# Patient Record
Sex: MALE | Race: WHITE | Employment: FULL TIME | URBAN - NONMETROPOLITAN AREA
[De-identification: names, ages, dates, MRNs, and addresses within clinical notes are randomized per-mention and may not be internally consistent; named-entity substitution may affect disease eponyms.]

---

## 2017-03-15 RX ORDER — LISINOPRIL 2.5 MG/1
2.5 TABLET ORAL DAILY
Qty: 90 TABLET | Refills: 4 | Status: SHIPPED | OUTPATIENT
Start: 2017-03-15 | End: 2017-09-07 | Stop reason: SDUPTHER

## 2017-03-15 RX ORDER — SIMVASTATIN 40 MG
40 TABLET ORAL NIGHTLY
Qty: 90 TABLET | Refills: 4 | Status: SHIPPED | OUTPATIENT
Start: 2017-03-15 | End: 2017-09-07 | Stop reason: SDUPTHER

## 2017-03-15 RX ORDER — CLOPIDOGREL BISULFATE 75 MG/1
75 TABLET ORAL DAILY
Qty: 90 TABLET | Refills: 4 | Status: SHIPPED | OUTPATIENT
Start: 2017-03-15 | End: 2017-09-07 | Stop reason: SDUPTHER

## 2017-09-07 ENCOUNTER — OFFICE VISIT (OUTPATIENT)
Dept: CARDIOLOGY CLINIC | Age: 57
End: 2017-09-07
Payer: COMMERCIAL

## 2017-09-07 VITALS
DIASTOLIC BLOOD PRESSURE: 70 MMHG | SYSTOLIC BLOOD PRESSURE: 120 MMHG | BODY MASS INDEX: 31.51 KG/M2 | HEART RATE: 60 BPM | WEIGHT: 232.6 LBS | HEIGHT: 72 IN

## 2017-09-07 DIAGNOSIS — I25.10 CORONARY ARTERY DISEASE INVOLVING NATIVE CORONARY ARTERY OF NATIVE HEART WITHOUT ANGINA PECTORIS: Primary | ICD-10-CM

## 2017-09-07 DIAGNOSIS — I25.2 HISTORY OF ACUTE MYOCARDIAL INFARCTION OF ANTERIOR WALL: ICD-10-CM

## 2017-09-07 DIAGNOSIS — F17.200 CURRENT SMOKER: ICD-10-CM

## 2017-09-07 DIAGNOSIS — Z95.5 PRESENCE OF STENT IN LAD CORONARY ARTERY: ICD-10-CM

## 2017-09-07 DIAGNOSIS — E78.5 DYSLIPIDEMIA: ICD-10-CM

## 2017-09-07 DIAGNOSIS — I51.9 LV DYSFUNCTION: ICD-10-CM

## 2017-09-07 DIAGNOSIS — I10 ESSENTIAL HYPERTENSION: ICD-10-CM

## 2017-09-07 PROCEDURE — 3017F COLORECTAL CA SCREEN DOC REV: CPT | Performed by: INTERNAL MEDICINE

## 2017-09-07 PROCEDURE — 1036F TOBACCO NON-USER: CPT | Performed by: INTERNAL MEDICINE

## 2017-09-07 PROCEDURE — G8427 DOCREV CUR MEDS BY ELIG CLIN: HCPCS | Performed by: INTERNAL MEDICINE

## 2017-09-07 PROCEDURE — G8598 ASA/ANTIPLAT THER USED: HCPCS | Performed by: INTERNAL MEDICINE

## 2017-09-07 PROCEDURE — G8417 CALC BMI ABV UP PARAM F/U: HCPCS | Performed by: INTERNAL MEDICINE

## 2017-09-07 PROCEDURE — 93000 ELECTROCARDIOGRAM COMPLETE: CPT | Performed by: INTERNAL MEDICINE

## 2017-09-07 PROCEDURE — 99213 OFFICE O/P EST LOW 20 MIN: CPT | Performed by: INTERNAL MEDICINE

## 2017-09-07 RX ORDER — SIMVASTATIN 40 MG
40 TABLET ORAL NIGHTLY
Qty: 90 TABLET | Refills: 4 | Status: SHIPPED | OUTPATIENT
Start: 2017-09-07 | End: 2018-09-15 | Stop reason: SDUPTHER

## 2017-09-07 RX ORDER — LISINOPRIL 2.5 MG/1
2.5 TABLET ORAL DAILY
Qty: 90 TABLET | Refills: 4 | Status: SHIPPED | OUTPATIENT
Start: 2017-09-07 | End: 2018-09-10 | Stop reason: SDUPTHER

## 2017-09-07 RX ORDER — CLOPIDOGREL BISULFATE 75 MG/1
75 TABLET ORAL DAILY
Qty: 90 TABLET | Refills: 4 | Status: SHIPPED | OUTPATIENT
Start: 2017-09-07 | End: 2018-09-15 | Stop reason: SDUPTHER

## 2018-09-10 ENCOUNTER — OFFICE VISIT (OUTPATIENT)
Dept: CARDIOLOGY CLINIC | Age: 58
End: 2018-09-10
Payer: COMMERCIAL

## 2018-09-10 VITALS
SYSTOLIC BLOOD PRESSURE: 136 MMHG | HEIGHT: 72 IN | BODY MASS INDEX: 32.44 KG/M2 | DIASTOLIC BLOOD PRESSURE: 84 MMHG | HEART RATE: 60 BPM | WEIGHT: 239.5 LBS

## 2018-09-10 DIAGNOSIS — I25.10 CORONARY ARTERY DISEASE INVOLVING NATIVE CORONARY ARTERY OF NATIVE HEART WITHOUT ANGINA PECTORIS: Primary | ICD-10-CM

## 2018-09-10 DIAGNOSIS — Z95.5 PRESENCE OF STENT IN LAD CORONARY ARTERY: ICD-10-CM

## 2018-09-10 DIAGNOSIS — I10 ESSENTIAL HYPERTENSION: ICD-10-CM

## 2018-09-10 DIAGNOSIS — E78.5 DYSLIPIDEMIA: ICD-10-CM

## 2018-09-10 PROCEDURE — 99214 OFFICE O/P EST MOD 30 MIN: CPT | Performed by: INTERNAL MEDICINE

## 2018-09-10 PROCEDURE — G8598 ASA/ANTIPLAT THER USED: HCPCS | Performed by: INTERNAL MEDICINE

## 2018-09-10 PROCEDURE — 1036F TOBACCO NON-USER: CPT | Performed by: INTERNAL MEDICINE

## 2018-09-10 PROCEDURE — G8427 DOCREV CUR MEDS BY ELIG CLIN: HCPCS | Performed by: INTERNAL MEDICINE

## 2018-09-10 PROCEDURE — G8417 CALC BMI ABV UP PARAM F/U: HCPCS | Performed by: INTERNAL MEDICINE

## 2018-09-10 PROCEDURE — 93000 ELECTROCARDIOGRAM COMPLETE: CPT | Performed by: INTERNAL MEDICINE

## 2018-09-10 PROCEDURE — 3017F COLORECTAL CA SCREEN DOC REV: CPT | Performed by: INTERNAL MEDICINE

## 2018-09-10 RX ORDER — LISINOPRIL 2.5 MG/1
2.5 TABLET ORAL DAILY
Qty: 90 TABLET | Refills: 3 | Status: SHIPPED | OUTPATIENT
Start: 2018-09-10 | End: 2020-09-15

## 2018-09-10 NOTE — PROGRESS NOTES
Chief Complaint   Patient presents with   Wallie Jeans    Coronary Artery Disease     Former Suzanne Villegas pt here for 1 yr check up     Pt denies chest pain, heart palpitations, peripheral edema, sob,     EKG done today    Records reviewed      Patient Active Problem List   Diagnosis    ST elevation myocardial infarction (STEMI) of anterior wall (HCC)    Presence of stent in LAD coronary artery    HTN (hypertension)    Dyslipidemia    History of acute myocardial infarction of anterior wall    Antiplatelet or antithrombotic long-term use    CAD (coronary artery disease)    Ex-smoker       Past Surgical History:   Procedure Laterality Date    APPENDECTOMY      CORONARY ANGIOPLASTY WITH STENT PLACEMENT  11/09/13       No Known Allergies     Family History   Problem Relation Age of Onset    Heart Disease Mother     High Blood Pressure Mother     High Cholesterol Mother     Cancer Father     Mental Retardation Sister         half sister - downs syndrome    Other Sister 61        sister - name unknown to pt.-blood disorder    Heart Disease Brother         Social History     Social History    Marital status:      Spouse name: N/A    Number of children: N/A    Years of education: N/A     Occupational History    Not on file.      Social History Main Topics    Smoking status: Former Smoker     Packs/day: 0.25     Years: 20.00    Smokeless tobacco: Never Used    Alcohol use 7.2 oz/week     12 Cans of beer per week    Drug use: No    Sexual activity: Yes     Partners: Female     Other Topics Concern    Not on file     Social History Narrative    No narrative on file       Current Outpatient Prescriptions   Medication Sig Dispense Refill    lisinopril (PRINIVIL;ZESTRIL) 2.5 MG tablet Take 1 tablet by mouth daily 90 tablet 4    clopidogrel (PLAVIX) 75 MG tablet Take 1 tablet by mouth daily 90 tablet 4    metoprolol tartrate (LOPRESSOR) 25 MG tablet Take 1 tablet by mouth 2 times daily 180 tablet 4  simvastatin (ZOCOR) 40 MG tablet Take 1 tablet by mouth nightly 90 tablet 4    [facility] ASPIRIN ADULT LOW DOSE 81 MG chewable tablet CHEW 1 TABLET DAILY 90 tablet 2     No current facility-administered medications for this visit. Review of Systems -     General ROS: negative  Psychological ROS: negative  Hematological and Lymphatic ROS: No history of blood clots or bleeding disorder. Respiratory ROS: no cough, shortness of breath, or wheezing  Cardiovascular ROS: no chest pain or dyspnea on exertion  Gastrointestinal ROS: negative  Genito-Urinary ROS: no dysuria, trouble voiding, or hematuria  Musculoskeletal ROS: negative  Neurological ROS: no TIA or stroke symptoms  Dermatological ROS: negative      Blood pressure 136/84, pulse 60, height 6' (1.829 m), weight 239 lb 8 oz (108.6 kg). Physical Examination:    General appearance - alert, well appearing, and in no distress  Mental status - alert, oriented to person, place, and time  Neck - supple, no significant adenopathy, no JVD, or carotid bruits  Chest - clear to auscultation, no wheezes, rales or rhonchi, symmetric air entry  Heart - normal rate, regular rhythm, normal S1, S2, no murmurs, rubs, clicks or gallops  Abdomen - soft, nontender, nondistended, no masses or organomegaly  Neurological - alert, oriented, normal speech, no focal findings or movement disorder noted  Musculoskeletal - no joint tenderness, deformity or swelling  Extremities - peripheral pulses normal, no pedal edema, no clubbing or cyanosis  Skin - normal coloration and turgor, no rashes, no suspicious skin lesions noted    Lab  No results for input(s): CKTOTAL, CKMB, CKMBINDEX, TROPONINI in the last 72 hours.   CBC:   Lab Results   Component Value Date    WBC 10.9 11/11/2013    RBC 4.90 11/11/2013    HGB 14.6 11/11/2013    HCT 43.5 11/11/2013    MCV 88.7 11/11/2013    MCH 29.7 11/11/2013    MCHC 33.5 11/11/2013    RDW 14.0 11/11/2013     11/11/2013    MPV 8.2 11/11/2013 BMP:    Lab Results   Component Value Date     11/10/2013    K 3.8 11/10/2013     11/10/2013    CO2 25 11/10/2013    BUN 14 11/10/2013    CREATININE 0.8 11/10/2013    CALCIUM 9.2 11/10/2013    LABGLOM >90 11/10/2013    GLUCOSE 130 11/10/2013     Hepatic Function Panel:  No results found for: ALKPHOS, ALT, AST, PROT, BILITOT, BILIDIR, IBILI, LABALBU  Magnesium:  No results found for: MG  Warfarin PT/INR:  No components found for: PTPATWAR, PTINRWAR  HgBA1c:  No results found for: LABA1C  FLP:    Lab Results   Component Value Date    TRIG 271 01/09/2016    HDL 41 01/09/2016    LDLCALC 73 01/09/2016     TSH:  No results found for: TSH    Sinus  Rhythm   WITHIN NORMAL LIMITS     cath 2013  FINDINGS:  The left main artery is large, short, and patent. It bifurcates  to left circumflex artery and left anterior descending artery. The left  circumflex artery is a dominant vessel and is patent. OM1 branch is a medium  to large caliber vessel and patent. The left anterior descending artery is a  large vessel. Proximally, it is patent. The mid LAD is subtotally occluded. There is a  diagonal branch coming off at the mid LAD segment. It is a  diagonal 2 branch and has ostial also 70-80 percent stenosis. The distal LAD  is patent. The right coronary artery is a nondominant vessel, is a medium  caliber vessel, and patent. The LVEDP is 8-10 mmHg. Anterior apical and  apical hypokinesia. LVEF is 45 percent. No mitral regurgitation or aortic  stenosis identified. subtolat occluded lad was stented for stemi      Assessment   Diagnosis Orders   1. Coronary artery disease involving native coronary artery of native heart without angina pectoris  EKG 12 Lead   2. Presence of stent in LAD coronary artery     3. Essential hypertension     4. Dyslipidemia           Dr. Gissel France office Visit Dx    History of Anterior Wall STEMI on 1/9/14. S/P PCI to LAD with 3.25-23 mm Drug-eluting stent.   Coronary artery

## 2018-09-15 DIAGNOSIS — E78.5 DYSLIPIDEMIA: ICD-10-CM

## 2018-09-15 DIAGNOSIS — I10 ESSENTIAL HYPERTENSION: Primary | ICD-10-CM

## 2018-09-17 RX ORDER — CLOPIDOGREL BISULFATE 75 MG/1
75 TABLET ORAL DAILY
Qty: 90 TABLET | Refills: 3 | Status: SHIPPED | OUTPATIENT
Start: 2018-09-17 | End: 2019-09-12 | Stop reason: SDUPTHER

## 2018-09-17 RX ORDER — LISINOPRIL 2.5 MG/1
2.5 TABLET ORAL DAILY
Qty: 90 TABLET | Refills: 3 | Status: SHIPPED | OUTPATIENT
Start: 2018-09-17 | End: 2019-09-10 | Stop reason: ALTCHOICE

## 2018-09-17 RX ORDER — SIMVASTATIN 40 MG
40 TABLET ORAL NIGHTLY
Qty: 90 TABLET | Refills: 3 | Status: SHIPPED | OUTPATIENT
Start: 2018-09-17 | End: 2019-10-21 | Stop reason: SDUPTHER

## 2018-09-18 LAB
A/G RATIO: NORMAL
ALBUMIN SERPL-MCNC: 4.6 G/DL
ALP BLD-CCNC: 50 U/L
ALT SERPL-CCNC: 32 U/L
AST SERPL-CCNC: 23 U/L
BILIRUB SERPL-MCNC: 0.4 MG/DL (ref 0.1–1.4)
BILIRUBIN DIRECT: 0.2 MG/DL
BILIRUBIN, INDIRECT: NORMAL
CHOLESTEROL, TOTAL: 151 MG/DL
CHOLESTEROL/HDL RATIO: NORMAL
GLOBULIN: NORMAL
HDLC SERPL-MCNC: 42 MG/DL (ref 35–70)
LDL CHOLESTEROL CALCULATED: 49 MG/DL (ref 0–160)
PROTEIN TOTAL: 7.3 G/DL
TRIGL SERPL-MCNC: 298 MG/DL
VLDLC SERPL CALC-MCNC: 60 MG/DL

## 2018-09-19 ENCOUNTER — TELEPHONE (OUTPATIENT)
Dept: CARDIOLOGY CLINIC | Age: 58
End: 2018-09-19

## 2018-09-19 DIAGNOSIS — I21.09 ST ELEVATION MYOCARDIAL INFARCTION (STEMI) OF ANTERIOR WALL (HCC): Primary | ICD-10-CM

## 2018-09-19 DIAGNOSIS — I25.10 CORONARY ARTERY DISEASE INVOLVING NATIVE CORONARY ARTERY OF NATIVE HEART WITHOUT ANGINA PECTORIS: ICD-10-CM

## 2018-09-19 DIAGNOSIS — E78.5 DYSLIPIDEMIA: ICD-10-CM

## 2019-09-10 ENCOUNTER — OFFICE VISIT (OUTPATIENT)
Dept: CARDIOLOGY CLINIC | Age: 59
End: 2019-09-10
Payer: COMMERCIAL

## 2019-09-10 VITALS
WEIGHT: 238.2 LBS | BODY MASS INDEX: 31.57 KG/M2 | DIASTOLIC BLOOD PRESSURE: 66 MMHG | HEART RATE: 62 BPM | HEIGHT: 73 IN | SYSTOLIC BLOOD PRESSURE: 108 MMHG

## 2019-09-10 DIAGNOSIS — E78.5 DYSLIPIDEMIA: ICD-10-CM

## 2019-09-10 DIAGNOSIS — I25.10 CORONARY ARTERY DISEASE INVOLVING NATIVE CORONARY ARTERY OF NATIVE HEART WITHOUT ANGINA PECTORIS: Primary | ICD-10-CM

## 2019-09-10 DIAGNOSIS — I25.5 ISCHEMIC CARDIOMYOPATHY: ICD-10-CM

## 2019-09-10 DIAGNOSIS — Z95.5 PRESENCE OF STENT IN LAD CORONARY ARTERY: ICD-10-CM

## 2019-09-10 DIAGNOSIS — I10 ESSENTIAL HYPERTENSION: ICD-10-CM

## 2019-09-10 PROCEDURE — G8598 ASA/ANTIPLAT THER USED: HCPCS | Performed by: INTERNAL MEDICINE

## 2019-09-10 PROCEDURE — G8427 DOCREV CUR MEDS BY ELIG CLIN: HCPCS | Performed by: INTERNAL MEDICINE

## 2019-09-10 PROCEDURE — 99214 OFFICE O/P EST MOD 30 MIN: CPT | Performed by: INTERNAL MEDICINE

## 2019-09-10 PROCEDURE — 3017F COLORECTAL CA SCREEN DOC REV: CPT | Performed by: INTERNAL MEDICINE

## 2019-09-10 PROCEDURE — G8417 CALC BMI ABV UP PARAM F/U: HCPCS | Performed by: INTERNAL MEDICINE

## 2019-09-10 PROCEDURE — 93000 ELECTROCARDIOGRAM COMPLETE: CPT | Performed by: INTERNAL MEDICINE

## 2019-09-10 PROCEDURE — 1036F TOBACCO NON-USER: CPT | Performed by: INTERNAL MEDICINE

## 2019-09-12 RX ORDER — CLOPIDOGREL BISULFATE 75 MG/1
TABLET ORAL
Qty: 90 TABLET | Refills: 3 | Status: SHIPPED | OUTPATIENT
Start: 2019-09-12 | End: 2020-09-14

## 2019-09-17 ENCOUNTER — HOSPITAL ENCOUNTER (OUTPATIENT)
Dept: NON INVASIVE DIAGNOSTICS | Age: 59
Discharge: HOME | End: 2019-09-17
Payer: COMMERCIAL

## 2019-09-17 VITALS — WEIGHT: 235 LBS | HEIGHT: 73 IN | BODY MASS INDEX: 31.14 KG/M2

## 2019-09-17 DIAGNOSIS — I10 ESSENTIAL HYPERTENSION: ICD-10-CM

## 2019-09-17 DIAGNOSIS — E78.5 DYSLIPIDEMIA: ICD-10-CM

## 2019-09-17 DIAGNOSIS — I25.5 ISCHEMIC CARDIOMYOPATHY: ICD-10-CM

## 2019-09-17 DIAGNOSIS — I25.10 CORONARY ARTERY DISEASE INVOLVING NATIVE CORONARY ARTERY OF NATIVE HEART WITHOUT ANGINA PECTORIS: ICD-10-CM

## 2019-09-17 DIAGNOSIS — Z95.5 PRESENCE OF STENT IN LAD CORONARY ARTERY: ICD-10-CM

## 2019-09-17 LAB
LV EF: 56 %
LV EF: 60 %
LVEF MODALITY: NORMAL
LVEF MODALITY: NORMAL

## 2019-09-17 PROCEDURE — 93306 TTE W/DOPPLER COMPLETE: CPT

## 2019-09-17 PROCEDURE — A9500 TC99M SESTAMIBI: HCPCS | Performed by: INTERNAL MEDICINE

## 2019-09-17 PROCEDURE — 78452 HT MUSCLE IMAGE SPECT MULT: CPT

## 2019-09-17 PROCEDURE — 93017 CV STRESS TEST TRACING ONLY: CPT | Performed by: INTERNAL MEDICINE

## 2019-09-17 PROCEDURE — 2709999900 HC NON-CHARGEABLE SUPPLY

## 2019-09-17 PROCEDURE — 3430000000 HC RX DIAGNOSTIC RADIOPHARMACEUTICAL: Performed by: INTERNAL MEDICINE

## 2019-09-17 RX ADMIN — Medication 10 MILLICURIE: at 14:36

## 2019-09-17 RX ADMIN — Medication 35 MILLICURIE: at 15:20

## 2019-09-28 LAB
A/G RATIO: NORMAL
ALBUMIN SERPL-MCNC: 4.5 G/DL
ALP BLD-CCNC: 48 U/L
ALT SERPL-CCNC: 27 U/L
AST SERPL-CCNC: 18 U/L
BILIRUB SERPL-MCNC: NORMAL MG/DL (ref 0.1–1.4)
BILIRUBIN DIRECT: NORMAL MG/DL
BILIRUBIN, INDIRECT: NORMAL
GLOBULIN: NORMAL
PROTEIN TOTAL: 7.1 G/DL

## 2019-10-21 RX ORDER — SIMVASTATIN 40 MG
40 TABLET ORAL NIGHTLY
Qty: 90 TABLET | Refills: 3 | Status: SHIPPED | OUTPATIENT
Start: 2019-10-21 | End: 2020-09-16 | Stop reason: SDUPTHER

## 2019-12-16 RX ORDER — LISINOPRIL 2.5 MG/1
TABLET ORAL
Qty: 90 TABLET | Refills: 3 | Status: SHIPPED | OUTPATIENT
Start: 2019-12-16 | End: 2020-09-15 | Stop reason: SDUPTHER

## 2020-09-14 RX ORDER — CLOPIDOGREL BISULFATE 75 MG/1
TABLET ORAL
Qty: 90 TABLET | Refills: 0 | Status: SHIPPED | OUTPATIENT
Start: 2020-09-14 | End: 2020-09-15 | Stop reason: SDUPTHER

## 2020-09-15 ENCOUNTER — OFFICE VISIT (OUTPATIENT)
Dept: CARDIOLOGY CLINIC | Age: 60
End: 2020-09-15
Payer: COMMERCIAL

## 2020-09-15 VITALS
BODY MASS INDEX: 31.42 KG/M2 | HEART RATE: 54 BPM | SYSTOLIC BLOOD PRESSURE: 116 MMHG | DIASTOLIC BLOOD PRESSURE: 66 MMHG | WEIGHT: 232 LBS | HEIGHT: 72 IN

## 2020-09-15 PROCEDURE — 3017F COLORECTAL CA SCREEN DOC REV: CPT | Performed by: INTERNAL MEDICINE

## 2020-09-15 PROCEDURE — 1036F TOBACCO NON-USER: CPT | Performed by: INTERNAL MEDICINE

## 2020-09-15 PROCEDURE — 93000 ELECTROCARDIOGRAM COMPLETE: CPT | Performed by: INTERNAL MEDICINE

## 2020-09-15 PROCEDURE — 99214 OFFICE O/P EST MOD 30 MIN: CPT | Performed by: INTERNAL MEDICINE

## 2020-09-15 PROCEDURE — G8417 CALC BMI ABV UP PARAM F/U: HCPCS | Performed by: INTERNAL MEDICINE

## 2020-09-15 PROCEDURE — G8427 DOCREV CUR MEDS BY ELIG CLIN: HCPCS | Performed by: INTERNAL MEDICINE

## 2020-09-15 NOTE — PROGRESS NOTES
Chief Complaint   Patient presents with    1 Year Follow Up     stress and echo     Former [id number] pt      1 year F/u     Doing well     Pt denies chest pain, heart palpitations, peripheral edema, sob,     EKG done today    Records reviewed      Patient Active Problem List   Diagnosis    ST elevation myocardial infarction (STEMI) of anterior wall (HCC)    Presence of stent in LAD coronary artery    HTN (hypertension)    Dyslipidemia    History of acute myocardial infarction of anterior wall    Antiplatelet or antithrombotic long-term use    CAD (coronary artery disease)    Ex-smoker    Ischemic cardiomyopathy 40 to 45%       Past Surgical History:   Procedure Laterality Date    APPENDECTOMY      CORONARY ANGIOPLASTY WITH STENT PLACEMENT  11/09/13       No Known Allergies     Family History   Problem Relation Age of Onset    Heart Disease Mother     High Blood Pressure Mother     High Cholesterol Mother     Cancer Father     Mental Retardation Sister         half sister - downs syndrome    Other Sister 61        sister - name unknown to pt.-blood disorder    Heart Disease Brother         Social History     Socioeconomic History    Marital status:      Spouse name: Not on file    Number of children: Not on file    Years of education: Not on file    Highest education level: Not on file   Occupational History    Not on file   Social Needs    Financial resource strain: Not on file    Food insecurity     Worry: Not on file     Inability: Not on file    Transportation needs     Medical: Not on file     Non-medical: Not on file   Tobacco Use    Smoking status: Former Smoker     Packs/day: 0.25     Years: 20.00     Pack years: 5.00    Smokeless tobacco: Never Used   Substance and Sexual Activity    Alcohol use:  Yes     Alcohol/week: 12.0 standard drinks     Types: 12 Cans of beer per week    Drug use: No    Sexual activity: Yes     Partners: Female   Lifestyle    Physical activity Days per week: Not on file     Minutes per session: Not on file    Stress: Not on file   Relationships    Social connections     Talks on phone: Not on file     Gets together: Not on file     Attends Christianity service: Not on file     Active member of club or organization: Not on file     Attends meetings of clubs or organizations: Not on file     Relationship status: Not on file    Intimate partner violence     Fear of current or ex partner: Not on file     Emotionally abused: Not on file     Physically abused: Not on file     Forced sexual activity: Not on file   Other Topics Concern    Not on file   Social History Narrative    Not on file       Current Outpatient Medications   Medication Sig Dispense Refill    clopidogrel (PLAVIX) 75 MG tablet TAKE 1 TABLET BY MOUTH EVERY DAY 90 tablet 0    metoprolol tartrate (LOPRESSOR) 25 MG tablet TAKE 1 TABLET BY MOUTH TWICE A  tablet 0    lisinopril (PRINIVIL;ZESTRIL) 2.5 MG tablet TAKE 1 TABLET BY MOUTH EVERY DAY 90 tablet 3    simvastatin (ZOCOR) 40 MG tablet Take 1 tablet by mouth nightly 90 tablet 3    [facility] ASPIRIN ADULT LOW DOSE 81 MG chewable tablet CHEW 1 TABLET DAILY 90 tablet 2     No current facility-administered medications for this visit. Review of Systems -     General ROS: negative  Psychological ROS: negative  Hematological and Lymphatic ROS: No history of blood clots or bleeding disorder. Respiratory ROS: no cough, shortness of breath, or wheezing  Cardiovascular ROS: no chest pain or dyspnea on exertion  Gastrointestinal ROS: negative  Genito-Urinary ROS: no dysuria, trouble voiding, or hematuria  Musculoskeletal ROS: negative  Neurological ROS: no TIA or stroke symptoms  Dermatological ROS: negative      Blood pressure 116/66, pulse 54, height 6' (1.829 m), weight 232 lb (105.2 kg).         Physical Examination:    General appearance - alert, well appearing, and in no distress  Mental status - alert, oriented to person, place, and time  Neck - supple, no significant adenopathy, no JVD, or carotid bruits  Chest - clear to auscultation, no wheezes, rales or rhonchi, symmetric air entry  Heart - normal rate, regular rhythm, normal S1, S2, no murmurs, rubs, clicks or gallops  Abdomen - soft, nontender, nondistended, no masses or organomegaly  Neurological - alert, oriented, normal speech, no focal findings or movement disorder noted  Musculoskeletal - no joint tenderness, deformity or swelling  Extremities - peripheral pulses normal, no pedal edema, no clubbing or cyanosis  Skin - normal coloration and turgor, no rashes, no suspicious skin lesions noted    Lab  No results for input(s): CKTOTAL, CKMB, CKMBINDEX, TROPONINI in the last 72 hours. CBC:   Lab Results   Component Value Date    WBC 10.9 11/11/2013    RBC 4.90 11/11/2013    HGB 14.6 11/11/2013    HCT 43.5 11/11/2013    MCV 88.7 11/11/2013    MCH 29.7 11/11/2013    MCHC 33.5 11/11/2013    RDW 14.0 11/11/2013     11/11/2013    MPV 8.2 11/11/2013     BMP:    Lab Results   Component Value Date     11/10/2013    K 3.8 11/10/2013     11/10/2013    CO2 25 11/10/2013    BUN 14 11/10/2013    LABALBU 4.5 09/28/2019    CREATININE 0.8 11/10/2013    CALCIUM 9.2 11/10/2013    LABGLOM >90 11/10/2013    GLUCOSE 130 11/10/2013     Hepatic Function Panel:    Lab Results   Component Value Date    ALKPHOS 48 09/28/2019    ALT 27 09/28/2019    AST 18 09/28/2019    PROT 7.1 09/28/2019    BILITOT 0.4 09/18/2018    BILIDIR 0.2 09/18/2018    LABALBU 4.5 09/28/2019     Magnesium:  No results found for: MG  Warfarin PT/INR:  No components found for: PTPATWAR, PTINRWAR  HgBA1c:  No results found for: LABA1C  FLP:    Lab Results   Component Value Date    TRIG 298 09/18/2018    HDL 42 09/18/2018    LDLCALC 49 09/18/2018     TSH:  No results found for: TSH    Sinus  Rhythm   WITHIN NORMAL LIMITS     cath 2013  FINDINGS:  The left main artery is large, short, and patent.   It bifurcates  to left circumflex artery and left anterior descending artery. The left  circumflex artery is a dominant vessel and is patent. OM1 branch is a medium  to large caliber vessel and patent. The left anterior descending artery is a  large vessel. Proximally, it is patent. The mid LAD is subtotally occluded. There is a  diagonal branch coming off at the mid LAD segment. It is a  diagonal 2 branch and has ostial also 70-80 percent stenosis. The distal LAD  is patent. The right coronary artery is a nondominant vessel, is a medium  caliber vessel, and patent. The LVEDP is 8-10 mmHg. Anterior apical and  apical hypokinesia. LVEF is 45 percent. No mitral regurgitation or aortic  stenosis identified. subtolat occluded lad was stented for stemi    SUMMARY:    Left ventricle:  Size was normal.  Systolic function was mildly to moderately reduced. Ejection fraction was  estimated in the range of 40 % to 45 %. There was mild diffuse hypokinesis. The mid anteroseptal wall was hypokinetic. The mid anterior wall was akinetic. The apical anterior wall was dyskinetic. Tricuspid valve: There was mild regurgitation. Prepared and signed by    Genna Lovell MD  Signed [id number] 08:20:10    ekg 9/10/19  Sinus  Rhythm   WITHIN NORMAL LIMITS       Conclusions          Summary     Exercise EKG stress test is not suggestive for ischemia.     The nuclear images is not suggestive for myocardial ischemia.          Signatures          ----------------------------------------------------------------     Electronically signed by Asya Dennis MD (Interpreting     Cardiologist) on 09/17/2019 at 16:16        ekg 9/15/2020  Sinus  Bradycardia   -Anteroseptal infarct -age undetermined. ABNORMAL       Assessment   Diagnosis Orders   1. Coronary artery disease involving native coronary artery of native heart without angina pectoris  EKG 12 Lead   2. Ischemic cardiomyopathy 40 to 45%     3. Essential hypertension     4. Dyslipidemia     5. Presence of stent in LAD coronary artery           Dr. Catherine Haywood office Visit Dx  History of Anterior Wall STEMI on 1/9/14. S/P PCI to LAD with 3.25-23 mm Drug-eluting stent. Coronary artery disease  Seems to be stable. Denies angina or change in breathing pattern. Continue ASA/BB/Statin/plavix.     Hypertension, on medical treatment. Seems to be under good control. Patient is compliant with medical treatment.      Dyslipidemia: He was on statin. He stopped livalo due to muscle ache. On zocor 40mg daily.     Mild LV dysfunction  On BB/ACEi      Plan    The current meds and labs reviewed     Continue the current treatment and with constant vigilance to changes in symptoms and also any potential side effects. Return for care or seek medical attention immediately if symptoms got worse and/or develop new symptoms. Coronary artery disease, seems to be stable. Denies angina or change in breathing pattern  S/p LAD stent in 2014  Echo and nuc stress 09/2019-m WNL      Cardiomyopathy: improving, no CHF symptoms, no change in clinical condition. Will need periodic echocardiograms depending on symptoms. EF 2013 40 to 45%  echo     Hyperlipidemia: on statins, followed periodically. Patient need periodic lipid and liver profile. Hypertension, on medical treatment. Seems to be under good control. Patient is compliant with medical treatment. Advised to get lab done once a year  tohave lab done tomorrwo    D/w the pat the plan of care    Lipid panel and liver function test before next appointment    Discussed use, benefit, and side effects of prescribed medications. All patient questions answered. Pt voiced understanding. Instructed to continue current medications, diet and exercise. Continue risk factor modification and medical management. Patient agreed with treatment plan. Follow up as directed.         RTC in  1 yr      Jill Slater [facility]

## 2020-09-16 RX ORDER — LISINOPRIL 2.5 MG/1
TABLET ORAL
Qty: 90 TABLET | Refills: 3 | Status: SHIPPED | OUTPATIENT
Start: 2020-09-16 | End: 2021-12-16

## 2020-09-16 RX ORDER — CLOPIDOGREL BISULFATE 75 MG/1
TABLET ORAL
Qty: 90 TABLET | Refills: 3 | Status: SHIPPED | OUTPATIENT
Start: 2020-09-16 | End: 2021-12-15

## 2020-09-16 RX ORDER — SIMVASTATIN 40 MG
40 TABLET ORAL NIGHTLY
Qty: 90 TABLET | Refills: 3 | Status: SHIPPED | OUTPATIENT
Start: 2020-09-16 | End: 2021-09-10

## 2021-09-10 RX ORDER — SIMVASTATIN 40 MG
TABLET ORAL
Qty: 90 TABLET | Refills: 3 | Status: SHIPPED | OUTPATIENT
Start: 2021-09-10 | End: 2022-09-06

## 2021-09-14 ENCOUNTER — OFFICE VISIT (OUTPATIENT)
Dept: CARDIOLOGY CLINIC | Age: 61
End: 2021-09-14
Payer: COMMERCIAL

## 2021-09-14 VITALS
BODY MASS INDEX: 30.77 KG/M2 | DIASTOLIC BLOOD PRESSURE: 65 MMHG | SYSTOLIC BLOOD PRESSURE: 113 MMHG | HEIGHT: 72 IN | HEART RATE: 69 BPM | WEIGHT: 227.2 LBS

## 2021-09-14 DIAGNOSIS — I25.10 CORONARY ARTERY DISEASE INVOLVING NATIVE CORONARY ARTERY OF NATIVE HEART WITHOUT ANGINA PECTORIS: Primary | ICD-10-CM

## 2021-09-14 DIAGNOSIS — I10 ESSENTIAL HYPERTENSION: ICD-10-CM

## 2021-09-14 DIAGNOSIS — Z95.5 PRESENCE OF STENT IN LAD CORONARY ARTERY: ICD-10-CM

## 2021-09-14 DIAGNOSIS — E78.5 DYSLIPIDEMIA: ICD-10-CM

## 2021-09-14 DIAGNOSIS — I25.5 ISCHEMIC CARDIOMYOPATHY: ICD-10-CM

## 2021-09-14 PROCEDURE — 99214 OFFICE O/P EST MOD 30 MIN: CPT | Performed by: INTERNAL MEDICINE

## 2021-09-14 PROCEDURE — 93000 ELECTROCARDIOGRAM COMPLETE: CPT | Performed by: INTERNAL MEDICINE

## 2021-09-14 NOTE — PROGRESS NOTES
 Ran Out of Food in the Last Year:    Transportation Needs:     Lack of Transportation (Medical):  Lack of Transportation (Non-Medical):    Physical Activity:     Days of Exercise per Week:     Minutes of Exercise per Session:    Stress:     Feeling of Stress :    Social Connections:     Frequency of Communication with Friends and Family:     Frequency of Social Gatherings with Friends and Family:     Attends Zoroastrian Services:     Active Member of Clubs or Organizations:     Attends Club or Organization Meetings:     Marital Status:    Intimate Partner Violence:     Fear of Current or Ex-Partner:     Emotionally Abused:     Physically Abused:     Sexually Abused:        Current Outpatient Medications   Medication Sig Dispense Refill    simvastatin (ZOCOR) 40 MG tablet TAKE 1 TABLET BY MOUTH EVERY DAY AT NIGHT 90 tablet 3    clopidogrel (PLAVIX) 75 MG tablet TAKE 1 TABLET BY MOUTH EVERY DAY 90 tablet 3    lisinopril (PRINIVIL;ZESTRIL) 2.5 MG tablet TAKE 1 TABLET BY MOUTH EVERY DAY 90 tablet 3    metoprolol tartrate (LOPRESSOR) 25 MG tablet TAKE 1 TABLET BY MOUTH TWICE A  tablet 3    [facility] ASPIRIN ADULT LOW DOSE 81 MG chewable tablet CHEW 1 TABLET DAILY 90 tablet 2     No current facility-administered medications for this visit. Review of Systems -     General ROS: negative  Psychological ROS: negative  Hematological and Lymphatic ROS: No history of blood clots or bleeding disorder. Respiratory ROS: no cough, shortness of breath, or wheezing  Cardiovascular ROS: no chest pain or dyspnea on exertion  Gastrointestinal ROS: negative  Genito-Urinary ROS: no dysuria, trouble voiding, or hematuria  Musculoskeletal ROS: negative  Neurological ROS: no TIA or stroke symptoms  Dermatological ROS: negative      Blood pressure 113/65, pulse 69, height 6' (1.829 m), weight 227 lb 3.2 oz (103.1 kg).         Physical Examination:    General appearance - alert, well appearing, and in no distress  Mental status - alert, oriented to person, place, and time  Neck - supple, no significant adenopathy, no JVD, or carotid bruits  Chest - clear to auscultation, no wheezes, rales or rhonchi, symmetric air entry  Heart - normal rate, regular rhythm, normal S1, S2, no murmurs, rubs, clicks or gallops  Abdomen - soft, nontender, nondistended, no masses or organomegaly  Neurological - alert, oriented, normal speech, no focal findings or movement disorder noted  Musculoskeletal - no joint tenderness, deformity or swelling  Extremities - peripheral pulses normal, no pedal edema, no clubbing or cyanosis  Skin - normal coloration and turgor, no rashes, no suspicious skin lesions noted    Lab  No results for input(s): CKTOTAL, CKMB, CKMBINDEX, TROPONINI in the last 72 hours.   CBC:   Lab Results   Component Value Date    WBC 10.9 11/11/2013    RBC 4.90 11/11/2013    HGB 14.6 11/11/2013    HCT 43.5 11/11/2013    MCV 88.7 11/11/2013    MCH 29.7 11/11/2013    MCHC 33.5 11/11/2013    RDW 14.0 11/11/2013     11/11/2013    MPV 8.2 11/11/2013     BMP:    Lab Results   Component Value Date     11/10/2013    K 3.8 11/10/2013     11/10/2013    CO2 25 11/10/2013    BUN 14 11/10/2013    LABALBU 4.5 09/28/2019    CREATININE 0.8 11/10/2013    CALCIUM 9.2 11/10/2013    LABGLOM >90 11/10/2013    GLUCOSE 130 11/10/2013     Hepatic Function Panel:    Lab Results   Component Value Date    ALKPHOS 48 09/28/2019    ALT 27 09/28/2019    AST 18 09/28/2019    PROT 7.1 09/28/2019    BILITOT 0.4 09/18/2018    BILIDIR 0.2 09/18/2018    LABALBU 4.5 09/28/2019     Magnesium:  No results found for: MG  Warfarin PT/INR:  No components found for: PTPATWAR, PTINRWAR  HgBA1c:  No results found for: LABA1C  FLP:    Lab Results   Component Value Date    TRIG 298 09/18/2018    HDL 42 09/18/2018    LDLCALC 49 09/18/2018     TSH:  No results found for: TSH    Sinus  Rhythm   WITHIN NORMAL LIMITS     cath 2013  FINDINGS:  The left main artery is large, short, and patent. It bifurcates  to left circumflex artery and left anterior descending artery. The left  circumflex artery is a dominant vessel and is patent. OM1 branch is a medium  to large caliber vessel and patent. The left anterior descending artery is a  large vessel. Proximally, it is patent. The mid LAD is subtotally occluded. There is a  diagonal branch coming off at the mid LAD segment. It is a  diagonal 2 branch and has ostial also 70-80 percent stenosis. The distal LAD  is patent. The right coronary artery is a nondominant vessel, is a medium  caliber vessel, and patent. The LVEDP is 8-10 mmHg. Anterior apical and  apical hypokinesia. LVEF is 45 percent. No mitral regurgitation or aortic  stenosis identified. subtolat occluded lad was stented for stemi    SUMMARY:    Left ventricle:  Size was normal.  Systolic function was mildly to moderately reduced. Ejection fraction was  estimated in the range of 40 % to 45 %. There was mild diffuse hypokinesis. The mid anteroseptal wall was hypokinetic. The mid anterior wall was akinetic. The apical anterior wall was dyskinetic. Tricuspid valve: There was mild regurgitation. Prepared and signed by    Pedro Jon MD  Signed [id number] 08:20:10    ekg 9/10/19  Sinus  Rhythm   WITHIN NORMAL LIMITS       Conclusions          Summary     Exercise EKG stress test is not suggestive for ischemia.     The nuclear images is not suggestive for myocardial ischemia.          Signatures          ----------------------------------------------------------------     Electronically signed by Nessa Henry MD (Interpreting     Cardiologist) on 09/17/2019 at 16:16        ekg 9/15/2020  Sinus  Bradycardia   -Anteroseptal infarct -age undetermined. ABNORMAL       ekg 9/14/21  Sinus  Rhythm   WITHIN NORMAL LIMITS      Assessment     Diagnosis Orders   1.  Coronary artery disease involving native coronary artery of native heart without angina pectoris  EKG 12 Lead    Hepatic Function Panel    Lipid Panel   2. Ischemic cardiomyopathy  EKG 12 Lead    Hepatic Function Panel    Lipid Panel   3. Essential hypertension  EKG 12 Lead    Hepatic Function Panel    Lipid Panel   4. Dyslipidemia  EKG 12 Lead    Hepatic Function Panel    Lipid Panel   5. Presence of stent in LAD coronary artery  EKG 12 Lead    Hepatic Function Panel    Lipid Panel         Dr. Heaven Chaidez office Visit Dx  History of Anterior Wall STEMI on 1/9/14. S/P PCI to LAD with 3.25-23 mm Drug-eluting stent. Coronary artery disease  Seems to be stable. Denies angina or change in breathing pattern. Continue ASA/BB/Statin/plavix.     Hypertension, on medical treatment. Seems to be under good control. Patient is compliant with medical treatment.      Dyslipidemia: He was on statin. He stopped livalo due to muscle ache. On zocor 40mg daily.     Mild LV dysfunction  On BB/ACEi      Plan    The most current meds and labs reviewed     Continue the current treatment and with constant vigilance to changes in symptoms and also any potential side effects. Return for care or seek medical attention immediately if symptoms got worse and/or develop new symptoms. Coronary artery disease, seems to be stable. Denies angina or change in breathing pattern  S/p LAD stent in 2014  Echo and nuc stress 09/2019-m WNL      Cardiomyopathy: improving, no CHF symptoms, no change in clinical condition. Will need periodic echocardiograms depending on symptoms. EF 2013 40 to 45%       Hyperlipidemia: on statins, followed periodically. Patient need periodic lipid and liver profile. Hypertension, on medical treatment. Seems to be under good control. Patient is compliant with medical treatment.      Advised to get lab done once a year  tohave lab done tomorrwo    D/w the pat the plan of care    Lipid panel and liver function test before next appointment    Discussed use, benefit, and side effects of prescribed medications. All patient questions answered. Pt voiced understanding. Instructed to continue current medications, diet and exercise. Continue risk factor modification and medical management. Patient agreed with treatment plan. Follow up as directed.         RTC in  1 yr      [facility]

## 2021-12-15 RX ORDER — CLOPIDOGREL BISULFATE 75 MG/1
TABLET ORAL
Qty: 90 TABLET | Refills: 0 | Status: SHIPPED | OUTPATIENT
Start: 2021-12-15 | End: 2022-03-11

## 2021-12-16 RX ORDER — LISINOPRIL 2.5 MG/1
TABLET ORAL
Qty: 90 TABLET | Refills: 1 | Status: SHIPPED | OUTPATIENT
Start: 2021-12-16 | End: 2022-06-13

## 2022-03-11 RX ORDER — CLOPIDOGREL BISULFATE 75 MG/1
TABLET ORAL
Qty: 90 TABLET | Refills: 0 | Status: SHIPPED | OUTPATIENT
Start: 2022-03-11 | End: 2022-06-13

## 2022-03-22 ENCOUNTER — OFFICE VISIT (OUTPATIENT)
Dept: CARDIOLOGY CLINIC | Age: 62
End: 2022-03-22
Payer: COMMERCIAL

## 2022-03-22 VITALS
HEIGHT: 72 IN | HEART RATE: 61 BPM | DIASTOLIC BLOOD PRESSURE: 80 MMHG | WEIGHT: 227 LBS | SYSTOLIC BLOOD PRESSURE: 146 MMHG | BODY MASS INDEX: 30.75 KG/M2

## 2022-03-22 DIAGNOSIS — I10 PRIMARY HYPERTENSION: ICD-10-CM

## 2022-03-22 DIAGNOSIS — I25.5 ISCHEMIC CARDIOMYOPATHY: ICD-10-CM

## 2022-03-22 DIAGNOSIS — I25.10 CORONARY ARTERY DISEASE INVOLVING NATIVE CORONARY ARTERY OF NATIVE HEART WITHOUT ANGINA PECTORIS: Primary | ICD-10-CM

## 2022-03-22 DIAGNOSIS — E78.5 DYSLIPIDEMIA: ICD-10-CM

## 2022-03-22 PROCEDURE — 99214 OFFICE O/P EST MOD 30 MIN: CPT | Performed by: INTERNAL MEDICINE

## 2022-03-22 NOTE — PROGRESS NOTES
Former Nolia Bullocks pt      6 month follow up. EKG done 9-. Pt denies chest pain, heart palpitations, peripheral edema, sob,     Records reviewed      Patient Active Problem List   Diagnosis    ST elevation myocardial infarction (STEMI) of anterior wall (HCC)    Presence of stent in LAD coronary artery    HTN (hypertension)    Dyslipidemia    History of acute myocardial infarction of anterior wall    Antiplatelet or antithrombotic long-term use    CAD (coronary artery disease)    Ex-smoker    Ischemic cardiomyopathy 40 to 45%       Past Surgical History:   Procedure Laterality Date    APPENDECTOMY      CORONARY ANGIOPLASTY WITH STENT PLACEMENT  11/09/13       No Known Allergies     Family History   Problem Relation Age of Onset    Heart Disease Mother     High Blood Pressure Mother     High Cholesterol Mother     Cancer Father     Mental Retardation Sister         half sister - downs syndrome    Other Sister 61        sister - name unknown to pt.-blood disorder    Heart Disease Brother         Social History     Socioeconomic History    Marital status:      Spouse name: Not on file    Number of children: Not on file    Years of education: Not on file    Highest education level: Not on file   Occupational History    Not on file   Tobacco Use    Smoking status: Former Smoker     Packs/day: 0.25     Years: 20.00     Pack years: 5.00    Smokeless tobacco: Never Used   Vaping Use    Vaping Use: Never used   Substance and Sexual Activity    Alcohol use:  Yes     Alcohol/week: 12.0 standard drinks     Types: 12 Cans of beer per week    Drug use: No    Sexual activity: Yes     Partners: Female   Other Topics Concern    Not on file   Social History Narrative    Not on file     Social Determinants of Health     Financial Resource Strain:     Difficulty of Paying Living Expenses: Not on file   Food Insecurity:     Worried About Running Out of Food in the Last Year: Not on file  Ran Out of Food in the Last Year: Not on file   Transportation Needs:     Lack of Transportation (Medical): Not on file    Lack of Transportation (Non-Medical): Not on file   Physical Activity:     Days of Exercise per Week: Not on file    Minutes of Exercise per Session: Not on file   Stress:     Feeling of Stress : Not on file   Social Connections:     Frequency of Communication with Friends and Family: Not on file    Frequency of Social Gatherings with Friends and Family: Not on file    Attends Anglican Services: Not on file    Active Member of Clubs or Organizations: Not on file    Attends Club or Organization Meetings: Not on file    Marital Status: Not on file   Intimate Partner Violence:     Fear of Current or Ex-Partner: Not on file    Emotionally Abused: Not on file    Physically Abused: Not on file    Sexually Abused: Not on file   Housing Stability:     Unable to Pay for Housing in the Last Year: Not on file    Number of Places Lived in the Last Year: Not on file    Unstable Housing in the Last Year: Not on file       Current Outpatient Medications   Medication Sig Dispense Refill    clopidogrel (PLAVIX) 75 MG tablet TAKE 1 TABLET BY MOUTH EVERY DAY 90 tablet 0    metoprolol tartrate (LOPRESSOR) 25 MG tablet TAKE 1 TABLET BY MOUTH TWICE A  tablet 0    lisinopril (PRINIVIL;ZESTRIL) 2.5 MG tablet TAKE 1 TABLET BY MOUTH EVERY DAY 90 tablet 1    simvastatin (ZOCOR) 40 MG tablet TAKE 1 TABLET BY MOUTH EVERY DAY AT NIGHT 90 tablet 3    [facility] ASPIRIN ADULT LOW DOSE 81 MG chewable tablet CHEW 1 TABLET DAILY 90 tablet 2     No current facility-administered medications for this visit. Review of Systems -     General ROS: negative  Psychological ROS: negative  Hematological and Lymphatic ROS: No history of blood clots or bleeding disorder.    Respiratory ROS: no cough, shortness of breath, or wheezing  Cardiovascular ROS: no chest pain or dyspnea on exertion  Gastrointestinal ROS: negative  Genito-Urinary ROS: no dysuria, trouble voiding, or hematuria  Musculoskeletal ROS: negative  Neurological ROS: no TIA or stroke symptoms  Dermatological ROS: negative      Blood pressure (!) 146/80, pulse 61, height 6' (1.829 m), weight 227 lb (103 kg). Physical Examination:    General appearance - alert, well appearing, and in no distress  Mental status - alert, oriented to person, place, and time  Neck - supple, no significant adenopathy, no JVD, or carotid bruits  Chest - clear to auscultation, no wheezes, rales or rhonchi, symmetric air entry  Heart - normal rate, regular rhythm, normal S1, S2, no murmurs, rubs, clicks or gallops  Abdomen - soft, nontender, nondistended, no masses or organomegaly  Neurological - alert, oriented, normal speech, no focal findings or movement disorder noted  Musculoskeletal - no joint tenderness, deformity or swelling  Extremities - peripheral pulses normal, no pedal edema, no clubbing or cyanosis  Skin - normal coloration and turgor, no rashes, no suspicious skin lesions noted    Lab  No results for input(s): CKTOTAL, CKMB, CKMBINDEX, TROPONINI in the last 72 hours.   CBC:   Lab Results   Component Value Date    WBC 10.9 11/11/2013    RBC 4.90 11/11/2013    HGB 14.6 11/11/2013    HCT 43.5 11/11/2013    MCV 88.7 11/11/2013    MCH 29.7 11/11/2013    MCHC 33.5 11/11/2013    RDW 14.0 11/11/2013     11/11/2013    MPV 8.2 11/11/2013     BMP:    Lab Results   Component Value Date     11/10/2013    K 3.8 11/10/2013     11/10/2013    CO2 25 11/10/2013    BUN 14 11/10/2013    LABALBU 4.5 09/28/2019    CREATININE 0.8 11/10/2013    CALCIUM 9.2 11/10/2013    LABGLOM >90 11/10/2013    GLUCOSE 130 11/10/2013     Hepatic Function Panel:    Lab Results   Component Value Date    ALKPHOS 48 09/28/2019    ALT 27 09/28/2019    AST 18 09/28/2019    PROT 7.1 09/28/2019    BILITOT 0.4 09/18/2018    BILIDIR 0.2 09/18/2018    LABALBU 4.5 09/28/2019     Magnesium: No results found for: MG  Warfarin PT/INR:  No components found for: PTPATWAR, PTINRWAR  HgBA1c:  No results found for: LABA1C  FLP:    Lab Results   Component Value Date    TRIG 298 09/18/2018    HDL 42 09/18/2018    LDLCALC 49 09/18/2018     TSH:  No results found for: TSH    Sinus  Rhythm   WITHIN NORMAL LIMITS     cath 2013  FINDINGS:  The left main artery is large, short, and patent. It bifurcates  to left circumflex artery and left anterior descending artery. The left  circumflex artery is a dominant vessel and is patent. OM1 branch is a medium  to large caliber vessel and patent. The left anterior descending artery is a  large vessel. Proximally, it is patent. The mid LAD is subtotally occluded. There is a  diagonal branch coming off at the mid LAD segment. It is a  diagonal 2 branch and has ostial also 70-80 percent stenosis. The distal LAD  is patent. The right coronary artery is a nondominant vessel, is a medium  caliber vessel, and patent. The LVEDP is 8-10 mmHg. Anterior apical and  apical hypokinesia. LVEF is 45 percent. No mitral regurgitation or aortic  stenosis identified. subtolat occluded lad was stented for stemi    SUMMARY:    Left ventricle:  Size was normal.  Systolic function was mildly to moderately reduced. Ejection fraction was  estimated in the range of 40 % to 45 %. There was mild diffuse hypokinesis. The mid anteroseptal wall was hypokinetic. The mid anterior wall was akinetic. The apical anterior wall was dyskinetic. Tricuspid valve: There was mild regurgitation.     Prepared and signed by    Jessa Graham MD  Signed [id number] 08:20:10    ekg 9/10/19  Sinus  Rhythm   WITHIN NORMAL LIMITS       Conclusions          Summary     Exercise EKG stress test is not suggestive for ischemia.     The nuclear images is not suggestive for myocardial ischemia.          Signatures          ----------------------------------------------------------------     Electronically signed by Erica Miller MD (Interpreting     Cardiologist) on 09/17/2019 at 16:16        ekg 9/15/2020  Sinus  Bradycardia   -Anteroseptal infarct -age undetermined. ABNORMAL       ekg 9/14/21  Sinus  Rhythm   WITHIN NORMAL LIMITS      Assessment     Diagnosis Orders   1. Coronary artery disease involving native coronary artery of native heart without angina pectoris     2. Primary hypertension     3. Dyslipidemia     4. Ischemic cardiomyopathy 40 to 45%           Dr. Pierre Vargas office Visit Dx  History of Anterior Wall STEMI on 1/9/14. S/P PCI to LAD with 3.25-23 mm Drug-eluting stent. Coronary artery disease  Seems to be stable. Denies angina or change in breathing pattern. Continue ASA/BB/Statin/plavix.     Hypertension, on medical treatment. Seems to be under good control. Patient is compliant with medical treatment.      Dyslipidemia: He was on statin. He stopped livalo due to muscle ache. On zocor 40mg daily.     Mild LV dysfunction  On BB/ACEi      Plan    The current meds and labs reviewed     Continue the current treatment and with constant vigilance to changes in symptoms and also any potential side effects. Return for care or seek medical attention immediately if symptoms got worse and/or develop new symptoms. Coronary artery disease, seems to be stable. Denies angina or change in breathing pattern  S/p LAD stent in 2014  Echo and nuc stress 09/2019-m WNL      Cardiomyopathy: improving, no CHF symptoms, no change in clinical condition. Will need periodic echocardiograms depending on symptoms. EF 2013 40 to 45%       Hyperlipidemia: on statins, followed periodically. Patient need periodic lipid and liver profile. \pcp follow his lab pe rpat    Hypertension, on medical treatment. Seems to be under good control. Patient is compliant with medical treatment.      Advised to get lab done once a year    D/w the pat the plan of care    Lipid panel and liver function test asap    Discussed use, benefit, and side effects of prescribed medications. All patient questions answered. Pt voiced understanding. Instructed to continue current medications, diet and exercise. Continue risk factor modification and medical management. Patient agreed with treatment plan. Follow up as directed.         RTC in  1 yr      Marga Auguste [facility]

## 2022-06-13 RX ORDER — CLOPIDOGREL BISULFATE 75 MG/1
TABLET ORAL
Qty: 90 TABLET | Refills: 2 | Status: SHIPPED | OUTPATIENT
Start: 2022-06-13

## 2022-06-13 RX ORDER — LISINOPRIL 2.5 MG/1
TABLET ORAL
Qty: 90 TABLET | Refills: 2 | Status: SHIPPED | OUTPATIENT
Start: 2022-06-13

## 2022-09-06 RX ORDER — SIMVASTATIN 40 MG
TABLET ORAL
Qty: 90 TABLET | Refills: 3 | Status: SHIPPED | OUTPATIENT
Start: 2022-09-06

## 2022-10-13 ENCOUNTER — TELEPHONE (OUTPATIENT)
Dept: CARDIOLOGY CLINIC | Age: 62
End: 2022-10-13

## 2022-10-13 LAB
A/G RATIO: 1.5
ALBUMIN SERPL-MCNC: 4.4 G/DL
ALP BLD-CCNC: 47 U/L
ALT SERPL-CCNC: 38 U/L
AST SERPL-CCNC: 42 U/L
BILIRUB SERPL-MCNC: 0.5 MG/DL (ref 0.1–1.4)
BILIRUBIN DIRECT: 0.1 MG/DL
BILIRUBIN, INDIRECT: 0.4
GLOBULIN: 3
PROTEIN TOTAL: 7.4 G/DL

## 2023-02-28 ENCOUNTER — OFFICE VISIT (OUTPATIENT)
Dept: CARDIOLOGY CLINIC | Age: 63
End: 2023-02-28
Payer: COMMERCIAL

## 2023-02-28 VITALS
BODY MASS INDEX: 32.59 KG/M2 | SYSTOLIC BLOOD PRESSURE: 136 MMHG | HEART RATE: 75 BPM | HEIGHT: 72 IN | DIASTOLIC BLOOD PRESSURE: 78 MMHG | WEIGHT: 240.6 LBS

## 2023-02-28 DIAGNOSIS — I10 PRIMARY HYPERTENSION: ICD-10-CM

## 2023-02-28 DIAGNOSIS — Z95.5 PRESENCE OF STENT IN LAD CORONARY ARTERY: ICD-10-CM

## 2023-02-28 DIAGNOSIS — E78.5 DYSLIPIDEMIA: ICD-10-CM

## 2023-02-28 DIAGNOSIS — I25.10 CORONARY ARTERY DISEASE INVOLVING NATIVE CORONARY ARTERY OF NATIVE HEART WITHOUT ANGINA PECTORIS: Primary | ICD-10-CM

## 2023-02-28 DIAGNOSIS — I25.5 ISCHEMIC CARDIOMYOPATHY: ICD-10-CM

## 2023-02-28 PROCEDURE — 93000 ELECTROCARDIOGRAM COMPLETE: CPT | Performed by: INTERNAL MEDICINE

## 2023-02-28 PROCEDURE — 3078F DIAST BP <80 MM HG: CPT | Performed by: INTERNAL MEDICINE

## 2023-02-28 PROCEDURE — 99214 OFFICE O/P EST MOD 30 MIN: CPT | Performed by: INTERNAL MEDICINE

## 2023-02-28 PROCEDURE — 3075F SYST BP GE 130 - 139MM HG: CPT | Performed by: INTERNAL MEDICINE

## 2023-02-28 NOTE — PROGRESS NOTES
Former Luc Banks pt        1 year follow up. EKG done today. Pt denies chest pain, heart palpitations, peripheral edema, sob,     Records reviewed      Patient Active Problem List   Diagnosis    ST elevation myocardial infarction (STEMI) of anterior wall (HCC)    Presence of stent in LAD coronary artery    HTN (hypertension)    Dyslipidemia    History of acute myocardial infarction of anterior wall    Antiplatelet or antithrombotic long-term use    CAD (coronary artery disease)    Ex-smoker    Ischemic cardiomyopathy 40 to 45%       Past Surgical History:   Procedure Laterality Date    APPENDECTOMY      CORONARY ANGIOPLASTY WITH STENT PLACEMENT  11/09/13       No Known Allergies     Family History   Problem Relation Age of Onset    Heart Disease Mother     High Blood Pressure Mother     High Cholesterol Mother     Cancer Father     Mental Retardation Sister         half sister - downs syndrome    Other Sister 61        sister - name unknown to pt.-blood disorder    Heart Disease Brother         Social History     Socioeconomic History    Marital status:      Spouse name: Not on file    Number of children: Not on file    Years of education: Not on file    Highest education level: Not on file   Occupational History    Not on file   Tobacco Use    Smoking status: Former     Packs/day: 0.25     Years: 20.00     Pack years: 5.00     Types: Cigarettes    Smokeless tobacco: Never   Vaping Use    Vaping Use: Never used   Substance and Sexual Activity    Alcohol use:  Yes     Alcohol/week: 12.0 standard drinks     Types: 12 Cans of beer per week    Drug use: No    Sexual activity: Yes     Partners: Female   Other Topics Concern    Not on file   Social History Narrative    Not on file     Social Determinants of Health     Financial Resource Strain: Not on file   Food Insecurity: Not on file   Transportation Needs: Not on file   Physical Activity: Not on file   Stress: Not on file   Social Connections: Not on file Intimate Partner Violence: Not on file   Housing Stability: Not on file       Current Outpatient Medications   Medication Sig Dispense Refill    simvastatin (ZOCOR) 40 MG tablet TAKE 1 TABLET BY MOUTH EVERY DAY AT NIGHT 90 tablet 3    clopidogrel (PLAVIX) 75 MG tablet TAKE 1 TABLET BY MOUTH EVERY DAY 90 tablet 2    metoprolol tartrate (LOPRESSOR) 25 MG tablet TAKE 1 TABLET BY MOUTH TWICE A  tablet 2    lisinopril (PRINIVIL;ZESTRIL) 2.5 MG tablet TAKE 1 TABLET BY MOUTH EVERY DAY 90 tablet 2    [facility] ASPIRIN ADULT LOW DOSE 81 MG chewable tablet CHEW 1 TABLET DAILY 90 tablet 2     No current facility-administered medications for this visit. Review of Systems -     General ROS: negative  Psychological ROS: negative  Hematological and Lymphatic ROS: No history of blood clots or bleeding disorder. Respiratory ROS: no cough, shortness of breath, or wheezing  Cardiovascular ROS: no chest pain or dyspnea on exertion  Gastrointestinal ROS: negative  Genito-Urinary ROS: no dysuria, trouble voiding, or hematuria  Musculoskeletal ROS: negative  Neurological ROS: no TIA or stroke symptoms  Dermatological ROS: negative      Blood pressure 136/78, pulse 75, height 6' (1.829 m), weight 240 lb 9.6 oz (109.1 kg).         Physical Examination:    General appearance - alert, well appearing, and in no distress  Mental status - alert, oriented to person, place, and time  Neck - supple, no significant adenopathy, no JVD, or carotid bruits  Chest - clear to auscultation, no wheezes, rales or rhonchi, symmetric air entry  Heart - normal rate, regular rhythm, normal S1, S2, no murmurs, rubs, clicks or gallops  Abdomen - soft, nontender, nondistended, no masses or organomegaly  Neurological - alert, oriented, normal speech, no focal findings or movement disorder noted  Musculoskeletal - no joint tenderness, deformity or swelling  Extremities - peripheral pulses normal, no pedal edema, no clubbing or cyanosis  Skin - normal coloration and turgor, no rashes, no suspicious skin lesions noted    Lab  No results for input(s): CKTOTAL, CKMB, CKMBINDEX, TROPONINI in the last 72 hours. CBC:   Lab Results   Component Value Date/Time    WBC 10.9 11/11/2013 04:18 AM    RBC 4.90 11/11/2013 04:18 AM    HGB 14.6 11/11/2013 04:18 AM    HCT 43.5 11/11/2013 04:18 AM    MCV 88.7 11/11/2013 04:18 AM    MCH 29.7 11/11/2013 04:18 AM    MCHC 33.5 11/11/2013 04:18 AM    RDW 14.0 11/11/2013 04:18 AM     11/11/2013 04:18 AM    MPV 8.2 11/11/2013 04:18 AM     BMP:    Lab Results   Component Value Date/Time     11/10/2013 05:05 AM    K 3.8 11/10/2013 05:05 AM     11/10/2013 05:05 AM    CO2 25 11/10/2013 05:05 AM    BUN 14 11/10/2013 05:05 AM    LABALBU 4.4 10/13/2022 12:00 AM    CREATININE 0.8 11/10/2013 05:05 AM    CALCIUM 9.2 11/10/2013 05:05 AM    LABGLOM >90 11/10/2013 06:00 AM    GLUCOSE 130 11/10/2013 05:05 AM     Hepatic Function Panel:    Lab Results   Component Value Date/Time    ALKPHOS 47 10/13/2022 12:00 AM    ALT 38 10/13/2022 12:00 AM    AST 42 10/13/2022 12:00 AM    PROT 7.4 10/13/2022 12:00 AM    BILITOT 0.5 10/13/2022 12:00 AM    BILIDIR 0.1 10/13/2022 12:00 AM    IBILI 0.4 10/13/2022 12:00 AM    LABALBU 4.4 10/13/2022 12:00 AM     Magnesium:  No results found for: MG  Warfarin PT/INR:  No components found for: PTPATWAR, PTINRWAR  HgBA1c:  No results found for: LABA1C  FLP:    Lab Results   Component Value Date/Time    TRIG 298 09/18/2018 12:00 AM    HDL 42 09/18/2018 12:00 AM    LDLCALC 49 09/18/2018 12:00 AM     TSH:  No results found for: TSH    Sinus  Rhythm   WITHIN NORMAL LIMITS     cath 2013  FINDINGS:  The left main artery is large, short, and patent. It bifurcates  to left circumflex artery and left anterior descending artery. The left  circumflex artery is a dominant vessel and is patent. OM1 branch is a medium  to large caliber vessel and patent. The left anterior descending artery is a  large vessel. Proximally, it is patent. The mid LAD is subtotally occluded. There is a  diagonal branch coming off at the mid LAD segment. It is a  diagonal 2 branch and has ostial also 70-80 percent stenosis. The distal LAD  is patent. The right coronary artery is a nondominant vessel, is a medium  caliber vessel, and patent. The LVEDP is 8-10 mmHg. Anterior apical and  apical hypokinesia. LVEF is 45 percent. No mitral regurgitation or aortic  stenosis identified. subtolat occluded lad was stented for stemi    SUMMARY:    Left ventricle:  Size was normal.  Systolic function was mildly to moderately reduced. Ejection fraction was  estimated in the range of 40 % to 45 %. There was mild diffuse hypokinesis. The mid anteroseptal wall was hypokinetic. The mid anterior wall was akinetic. The apical anterior wall was dyskinetic. Tricuspid valve: There was mild regurgitation. Prepared and signed by    Betty Mcmullen MD  Signed [id number] 08:20:10     Conclusions    Summary   Normal left ventricle size and systolic function. Ejection fraction was   estimated at 60 %. There were no regional left ventricular wall motion   abnormalities and wall thickness was within normal limits.     Signature    ----------------------------------------------------------------   Electronically signed by Erickson Ford MD (Interpreting   physician) on 09/17/2019 at 07:57 PM        ekg 9/10/19  Sinus  Rhythm   WITHIN NORMAL LIMITS            Peak HR:148 bpm                      HR response: Appropriate    Peak BP:182/78 mmHg                  BP response: Normal Resting BP with    Predicted HR: 163 bpm                appropriate response to Stress    % of predicted HR: 91                HR/BP product:[id number]    Test duration:9 min                  Max exercise: 10.1 METS    Reason for termination:Target HR    achieved                             Exercise effort:Good      Conclusions          Summary     Exercise EKG stress test is not suggestive for ischemia. The nuclear images is not suggestive for myocardial ischemia. Signatures          ----------------------------------------------------------------     Electronically signed by Erickson Ford MD (Interpreting     Cardiologist) on 09/17/2019 at 16:16        ekg 9/15/2020  Sinus  Bradycardia   -Anteroseptal infarct -age undetermined. ABNORMAL       ekg 9/14/21  Sinus  Rhythm   WITHIN NORMAL LIMITS    Ekg 2/28/2023  Sinus  Rhythm   WITHIN NORMAL LIMITS        Assessment     Diagnosis Orders   1. Coronary artery disease involving native coronary artery of native heart without angina pectoris  EKG 12 Lead      2. Primary hypertension        3. Dyslipidemia        4. Ischemic cardiomyopathy 40 to 45%        5. Presence of stent in LAD coronary artery              Dr. Freddie Yeh office Visit Dx  History of Anterior Wall STEMI on 1/9/14. S/P PCI to LAD with 3.25-23 mm Drug-eluting stent. Coronary artery disease  Seems to be stable. Denies angina or change in breathing pattern. Continue ASA/BB/Statin/plavix. Hypertension, on medical treatment. Seems to be under good control. Patient is compliant with medical treatment. Dyslipidemia: He was on statin. He stopped livalo due to muscle ache. On zocor 40mg daily. Mild LV dysfunction  On BB/ACEi      Plan    The most current meds and labs reviewed  Lab from sept 2022 reviewed and wnl     Continue the current treatment and with constant vigilance to changes in symptoms and also any potential side effects. Return for care or seek medical attention immediately if symptoms got worse and/or develop new symptoms. Coronary artery disease, seems to be stable. Denies angina or change in breathing pattern  S/p LAD stent in 2014  Echo and nuc stress 09/2019-m WNL      Cardiomyopathy: improving, no CHF symptoms, no change in clinical condition. Will need periodic echocardiograms depending on symptoms.  EF 2013 40 to 45% Hyperlipidemia: on statins, followed periodically. Patient need periodic lipid and liver profile. \pcp follow his lab pe rpat    Hypertension, on medical treatment. Seems to be under good control. Patient is compliant with medical treatment. Advised to get lab done once a year    D/w the pat the plan of care    Lipid panel and liver function test before next appointment    Stable and doing well    Discussed use, benefit, and side effects of prescribed medications. All patient questions answered. Pt voiced understanding. Instructed to continue current medications, diet and exercise. Continue risk factor modification and medical management. Patient agreed with treatment plan. Follow up as directed.       RTC in  1 yr      [facility]

## 2023-03-27 RX ORDER — CLOPIDOGREL BISULFATE 75 MG/1
TABLET ORAL
Qty: 90 TABLET | Refills: 3 | Status: SHIPPED | OUTPATIENT
Start: 2023-03-27

## 2023-03-27 RX ORDER — LISINOPRIL 2.5 MG/1
TABLET ORAL
Qty: 90 TABLET | Refills: 3 | Status: SHIPPED | OUTPATIENT
Start: 2023-03-27

## 2023-09-29 RX ORDER — SIMVASTATIN 40 MG
TABLET ORAL
Qty: 90 TABLET | Refills: 1 | Status: SHIPPED | OUTPATIENT
Start: 2023-09-29

## 2024-03-26 ENCOUNTER — OFFICE VISIT (OUTPATIENT)
Dept: CARDIOLOGY CLINIC | Age: 64
End: 2024-03-26
Payer: COMMERCIAL

## 2024-03-26 VITALS
SYSTOLIC BLOOD PRESSURE: 129 MMHG | HEIGHT: 72 IN | HEART RATE: 65 BPM | WEIGHT: 240.8 LBS | BODY MASS INDEX: 32.61 KG/M2 | DIASTOLIC BLOOD PRESSURE: 66 MMHG

## 2024-03-26 DIAGNOSIS — E78.5 DYSLIPIDEMIA: ICD-10-CM

## 2024-03-26 DIAGNOSIS — Z95.5 PRESENCE OF STENT IN LAD CORONARY ARTERY: ICD-10-CM

## 2024-03-26 DIAGNOSIS — Z87.891 EX-SMOKER: ICD-10-CM

## 2024-03-26 DIAGNOSIS — I25.10 CORONARY ARTERY DISEASE INVOLVING NATIVE CORONARY ARTERY OF NATIVE HEART WITHOUT ANGINA PECTORIS: Primary | ICD-10-CM

## 2024-03-26 DIAGNOSIS — I10 PRIMARY HYPERTENSION: ICD-10-CM

## 2024-03-26 DIAGNOSIS — I25.5 ISCHEMIC CARDIOMYOPATHY: ICD-10-CM

## 2024-03-26 PROCEDURE — 99214 OFFICE O/P EST MOD 30 MIN: CPT | Performed by: INTERNAL MEDICINE

## 2024-03-26 PROCEDURE — 3078F DIAST BP <80 MM HG: CPT | Performed by: INTERNAL MEDICINE

## 2024-03-26 PROCEDURE — 93000 ELECTROCARDIOGRAM COMPLETE: CPT | Performed by: INTERNAL MEDICINE

## 2024-03-26 PROCEDURE — 3074F SYST BP LT 130 MM HG: CPT | Performed by: INTERNAL MEDICINE

## 2024-03-26 NOTE — PROGRESS NOTES
Former [facility] pt      1 year follow up.    EKG done today.    Pt denies chest pain, heart palpitations, peripheral edema, sob,     Records reviewed      Patient Active Problem List   Diagnosis    ST elevation myocardial infarction (STEMI) of anterior wall (HCC)    Presence of stent in LAD coronary artery    HTN (hypertension)    Dyslipidemia    History of acute myocardial infarction of anterior wall    Antiplatelet or antithrombotic long-term use    CAD (coronary artery disease)    Ex-smoker    Ischemic cardiomyopathy 40 to 45%       Past Surgical History:   Procedure Laterality Date    APPENDECTOMY      CORONARY ANGIOPLASTY WITH STENT PLACEMENT  11/09/13       No Known Allergies     Family History   Problem Relation Age of Onset    Heart Disease Mother     High Blood Pressure Mother     High Cholesterol Mother     Cancer Father     Mental Retardation Sister         half sister - downs syndrome    Other Sister 60        sister - name unknown to pt.-blood disorder    Heart Disease Brother         Social History     Socioeconomic History    Marital status:      Spouse name: Not on file    Number of children: Not on file    Years of education: Not on file    Highest education level: Not on file   Occupational History    Not on file   Tobacco Use    Smoking status: Former     Current packs/day: 0.25     Average packs/day: 0.3 packs/day for 20.0 years (5.0 ttl pk-yrs)     Types: Cigarettes    Smokeless tobacco: Never   Vaping Use    Vaping Use: Never used   Substance and Sexual Activity    Alcohol use: Yes     Alcohol/week: 12.0 standard drinks of alcohol     Types: 12 Cans of beer per week    Drug use: No    Sexual activity: Yes     Partners: Female   Other Topics Concern    Not on file   Social History Narrative    Not on file     Social Determinants of Health     Financial Resource Strain: Not on file   Food Insecurity: Not on file   Transportation Needs: Not on file   Physical Activity: Not on file

## 2024-04-03 RX ORDER — LISINOPRIL 2.5 MG/1
2.5 TABLET ORAL DAILY
Qty: 90 TABLET | Refills: 3 | Status: SHIPPED | OUTPATIENT
Start: 2024-04-03

## 2024-04-03 RX ORDER — CLOPIDOGREL BISULFATE 75 MG/1
75 TABLET ORAL DAILY
Qty: 90 TABLET | Refills: 3 | Status: SHIPPED | OUTPATIENT
Start: 2024-04-03

## 2024-04-03 RX ORDER — SIMVASTATIN 40 MG
40 TABLET ORAL NIGHTLY
Qty: 90 TABLET | Refills: 3 | Status: SHIPPED | OUTPATIENT
Start: 2024-04-03

## 2024-04-03 NOTE — TELEPHONE ENCOUNTER
Juan is requesting a refill of their   Requested Prescriptions     Pending Prescriptions Disp Refills    clopidogrel (PLAVIX) 75 MG tablet 90 tablet 3     Sig: Take 1 tablet by mouth daily    simvastatin (ZOCOR) 40 MG tablet 90 tablet 1     Sig: Take 1 tablet by mouth nightly    metoprolol tartrate (LOPRESSOR) 25 MG tablet 180 tablet 3     Sig: Take 1 tablet by mouth 2 times daily    lisinopril (PRINIVIL;ZESTRIL) 2.5 MG tablet 90 tablet 3     Sig: Take 1 tablet by mouth daily   . Please advise.      Last Appt:  Visit date not found  Next Appt:   Visit date not found  Preferred pharmacy: [facility]/pharmacy #[id number] - [place], [place] - [address] [phone] F [phone]

## 2025-03-25 ENCOUNTER — OFFICE VISIT (OUTPATIENT)
Dept: CARDIOLOGY CLINIC | Age: 65
End: 2025-03-25
Payer: COMMERCIAL

## 2025-03-25 VITALS
DIASTOLIC BLOOD PRESSURE: 70 MMHG | SYSTOLIC BLOOD PRESSURE: 120 MMHG | HEIGHT: 72 IN | BODY MASS INDEX: 33.12 KG/M2 | HEART RATE: 59 BPM | WEIGHT: 244.5 LBS

## 2025-03-25 DIAGNOSIS — I10 PRIMARY HYPERTENSION: ICD-10-CM

## 2025-03-25 DIAGNOSIS — Z95.5 PRESENCE OF STENT IN LAD CORONARY ARTERY: ICD-10-CM

## 2025-03-25 DIAGNOSIS — E78.5 DYSLIPIDEMIA: ICD-10-CM

## 2025-03-25 DIAGNOSIS — I25.10 CORONARY ARTERY DISEASE INVOLVING NATIVE CORONARY ARTERY OF NATIVE HEART WITHOUT ANGINA PECTORIS: Primary | ICD-10-CM

## 2025-03-25 DIAGNOSIS — I25.5 ISCHEMIC CARDIOMYOPATHY: ICD-10-CM

## 2025-03-25 PROCEDURE — 93000 ELECTROCARDIOGRAM COMPLETE: CPT | Performed by: INTERNAL MEDICINE

## 2025-03-25 PROCEDURE — 3078F DIAST BP <80 MM HG: CPT | Performed by: INTERNAL MEDICINE

## 2025-03-25 PROCEDURE — 99214 OFFICE O/P EST MOD 30 MIN: CPT | Performed by: INTERNAL MEDICINE

## 2025-03-25 PROCEDURE — 3074F SYST BP LT 130 MM HG: CPT | Performed by: INTERNAL MEDICINE

## 2025-03-25 NOTE — PROGRESS NOTES
subtotally occluded.  There is a  diagonal branch coming off at the mid LAD segment.  It is a  diagonal 2 branch and has ostial also 70-80 percent stenosis.  The distal LAD  is patent.  The right coronary artery is a nondominant vessel, is a medium  caliber vessel, and patent.  The LVEDP is 8-10 mmHg.  Anterior apical and  apical hypokinesia.  LVEF is 45 percent.  No mitral regurgitation or aortic  stenosis identified.    subtolat occluded lad was stented for stemi    SUMMARY:    Left ventricle:  Size was normal.  Systolic function was mildly to moderately reduced. Ejection fraction was  estimated in the range of 40 % to 45 %.  There was mild diffuse hypokinesis.  The mid anteroseptal wall was hypokinetic.  The mid anterior wall was akinetic.  The apical anterior wall was dyskinetic.    Tricuspid valve:  There was mild regurgitation.    Prepared and signed by    Hieu Alfaro MD  Signed 10-Nov-2013 08:20:10     Conclusions    Summary   Normal left ventricle size and systolic function. Ejection fraction was   estimated at 60 %. There were no regional left ventricular wall motion   abnormalities and wall thickness was within normal limits.    Signature    ----------------------------------------------------------------   Electronically signed by Javy De Guzman MD (Interpreting   physician) on 09/17/2019 at 07:57 PM        ekg 9/10/19  Sinus  Rhythm   WITHIN NORMAL LIMITS            Peak HR:148 bpm                      HR response: Appropriate    Peak BP:182/78 mmHg                  BP response: Normal Resting BP with    Predicted HR: 163 bpm                appropriate response to Stress    % of predicted HR: 91                HR/BP product:[id number]    Test duration:9 min                  Max exercise: 10.1 METS    Reason for termination:Target HR    achieved                             Exercise effort:Good      Conclusions          Summary     Exercise EKG stress test is not suggestive for ischemia.     The nuclear

## 2025-04-02 ENCOUNTER — HOSPITAL ENCOUNTER (OUTPATIENT)
Age: 65
Discharge: HOME | End: 2025-04-04
Attending: INTERNAL MEDICINE
Payer: COMMERCIAL

## 2025-04-02 DIAGNOSIS — Z95.5 PRESENCE OF STENT IN LAD CORONARY ARTERY: ICD-10-CM

## 2025-04-02 DIAGNOSIS — I10 PRIMARY HYPERTENSION: ICD-10-CM

## 2025-04-02 DIAGNOSIS — I25.10 CORONARY ARTERY DISEASE INVOLVING NATIVE CORONARY ARTERY OF NATIVE HEART WITHOUT ANGINA PECTORIS: ICD-10-CM

## 2025-04-02 DIAGNOSIS — E78.5 DYSLIPIDEMIA: ICD-10-CM

## 2025-04-02 DIAGNOSIS — I25.5 ISCHEMIC CARDIOMYOPATHY: ICD-10-CM

## 2025-04-02 LAB
ECHO AO ASC DIAM: 3.8 CM
ECHO AV CUSP MM: 2.5 CM
ECHO EST RA PRESSURE: 3 MMHG
ECHO LA AREA 2C: 17.6 CM2
ECHO LA AREA 4C: 18.5 CM2
ECHO LA DIAMETER: 4 CM
ECHO LA MAJOR AXIS: 6.1 CM
ECHO LA MINOR AXIS: 5.1 CM
ECHO LA VOL BP: 52 ML (ref 18–58)
ECHO LA VOL MOD A2C: 49 ML (ref 18–58)
ECHO LA VOL MOD A4C: 47 ML (ref 18–58)
ECHO LV E' LATERAL VELOCITY: 6.6 CM/S
ECHO LV E' SEPTAL VELOCITY: 6 CM/S
ECHO LV EDV A2C: 133 ML
ECHO LV EDV A4C: 131 ML
ECHO LV EF PHYSICIAN: 60 %
ECHO LV EJECTION FRACTION A2C: 58 %
ECHO LV EJECTION FRACTION A4C: 57 %
ECHO LV EJECTION FRACTION BIPLANE: 58 % (ref 55–100)
ECHO LV ESV A2C: 56 ML
ECHO LV ESV A4C: 56 ML
ECHO LV FRACTIONAL SHORTENING: 33 % (ref 28–44)
ECHO LV INTERNAL DIMENSION DIASTOLIC: 5.2 CM (ref 4.2–5.9)
ECHO LV INTERNAL DIMENSION SYSTOLIC: 3.5 CM
ECHO LV ISOVOLUMETRIC RELAXATION TIME (IVRT): 70 MS
ECHO LV IVSD: 0.9 CM (ref 0.6–1)
ECHO LV MASS 2D: 169 G (ref 88–224)
ECHO LV POSTERIOR WALL DIASTOLIC: 0.9 CM (ref 0.6–1)
ECHO LV RELATIVE WALL THICKNESS RATIO: 0.35
ECHO LVOT MEAN GRADIENT: 2 MMHG
ECHO LVOT PEAK GRADIENT: 4 MMHG
ECHO LVOT PEAK VELOCITY: 1 M/S
ECHO LVOT VTI: 23.1 CM
ECHO MV A VELOCITY: 0.66 M/S
ECHO MV E DECELERATION TIME (DT): 233 MS
ECHO MV E VELOCITY: 0.67 M/S
ECHO MV E/A RATIO: 1.02
ECHO MV E/E' LATERAL: 10.15
ECHO MV E/E' RATIO (AVERAGED): 10.66
ECHO MV E/E' SEPTAL: 11.17
ECHO PULMONARY ARTERY END DIASTOLIC PRESSURE: 5 MMHG
ECHO PV MAX VELOCITY: 0.6 M/S
ECHO PV PEAK GRADIENT: 2 MMHG
ECHO PV REGURGITANT MAX VELOCITY: 1.2 M/S
ECHO RIGHT VENTRICULAR SYSTOLIC PRESSURE (RVSP): 23 MMHG
ECHO RV INTERNAL DIMENSION: 2.9 CM
ECHO RV TAPSE: 2.6 CM (ref 1.7–?)
ECHO TV E WAVE: 0.7 M/S
ECHO TV REGURGITANT MAX VELOCITY: 2.25 M/S
ECHO TV REGURGITANT PEAK GRADIENT: 20 MMHG

## 2025-04-02 PROCEDURE — 93306 TTE W/DOPPLER COMPLETE: CPT

## 2025-04-16 RX ORDER — METOPROLOL TARTRATE 25 MG/1
25 TABLET, FILM COATED ORAL 2 TIMES DAILY
Qty: 180 TABLET | Refills: 3 | Status: SHIPPED | OUTPATIENT
Start: 2025-04-16

## 2025-04-16 RX ORDER — LISINOPRIL 2.5 MG/1
2.5 TABLET ORAL DAILY
Qty: 90 TABLET | Refills: 3 | Status: SHIPPED | OUTPATIENT
Start: 2025-04-16

## 2025-04-16 RX ORDER — CLOPIDOGREL BISULFATE 75 MG/1
75 TABLET ORAL DAILY
Qty: 90 TABLET | Refills: 3 | Status: SHIPPED | OUTPATIENT
Start: 2025-04-16

## 2025-04-16 RX ORDER — SIMVASTATIN 40 MG
40 TABLET ORAL NIGHTLY
Qty: 90 TABLET | Refills: 3 | Status: SHIPPED | OUTPATIENT
Start: 2025-04-16